# Patient Record
Sex: MALE | Race: WHITE | NOT HISPANIC OR LATINO | URBAN - METROPOLITAN AREA
[De-identification: names, ages, dates, MRNs, and addresses within clinical notes are randomized per-mention and may not be internally consistent; named-entity substitution may affect disease eponyms.]

---

## 2018-05-17 PROBLEM — 63532004 DIVERTICULA OF INTESTINE: Status: ACTIVE | Noted: 2018-05-17

## 2018-05-17 PROBLEM — 39839004 DIAPHRAGMATIC HERNIA: Status: ACTIVE | Noted: 2018-05-17

## 2018-05-17 PROBLEM — 302914006 BARRETT'S ESOPHAGUS: Status: ACTIVE | Noted: 2018-05-17

## 2018-05-17 PROBLEM — 414916001 OBESITY: Status: ACTIVE | Noted: 2018-05-17

## 2018-05-17 PROBLEM — 428283002 HISTORY OF POLYP OF COLON (SITUATION): Status: ACTIVE | Noted: 2018-05-17

## 2021-04-08 ENCOUNTER — OFFICE VISIT (OUTPATIENT)
Dept: URBAN - METROPOLITAN AREA CLINIC 27 | Facility: CLINIC | Age: 51
End: 2021-04-08

## 2021-05-03 ENCOUNTER — OFFICE VISIT (OUTPATIENT)
Dept: URBAN - METROPOLITAN AREA SURGERY CENTER 7 | Facility: SURGERY CENTER | Age: 51
End: 2021-05-03

## 2022-04-30 ENCOUNTER — TELEPHONE ENCOUNTER (OUTPATIENT)
Dept: URBAN - METROPOLITAN AREA CLINIC 121 | Facility: CLINIC | Age: 52
End: 2022-04-30

## 2022-04-30 RX ORDER — AMLODIPINE AND ATORVASTATIN 5; 20 MG/1; MG/1
QD TABLET, FILM COATED ORAL
OUTPATIENT
Start: 2014-06-16

## 2022-04-30 RX ORDER — AMLODIPINE AND ATORVASTATIN 5; 20 MG/1; MG/1
QD TABLET, FILM COATED ORAL
OUTPATIENT
Start: 2014-06-16 | End: 2018-05-17

## 2022-04-30 RX ORDER — LISINOPRIL 10 MG/1
QD TABLET ORAL
OUTPATIENT
Start: 2014-06-16

## 2022-04-30 RX ORDER — AMLODIPINE/ATORVASTATIN 5 MG-10 MG
TABLET ORAL
OUTPATIENT
Start: 2018-05-17

## 2022-04-30 RX ORDER — CLONAZEPAM 1 MG/1
2/ QD TABLET ORAL
OUTPATIENT
Start: 2014-06-16

## 2022-04-30 RX ORDER — LISINOPRIL 10 MG/1
QD TABLET ORAL
OUTPATIENT
Start: 2014-06-16 | End: 2018-05-17

## 2022-04-30 RX ORDER — CLONAZEPAM 1 MG/1
2/ QD TABLET ORAL
OUTPATIENT
Start: 2014-06-16 | End: 2018-05-17

## 2022-04-30 RX ORDER — AMLODIPINE/ATORVASTATIN 5 MG-10 MG
TABLET ORAL
OUTPATIENT
Start: 2018-05-17 | End: 2021-05-03

## 2022-05-01 ENCOUNTER — TELEPHONE ENCOUNTER (OUTPATIENT)
Dept: URBAN - METROPOLITAN AREA CLINIC 121 | Facility: CLINIC | Age: 52
End: 2022-05-01

## 2022-05-01 RX ORDER — QUINAPRIL HYDROCHLORIDE 40 MG/1
TABLET, FILM COATED ORAL
Status: ACTIVE | COMMUNITY
Start: 2018-05-17

## 2022-05-01 RX ORDER — ICOSAPENT ETHYL 1000 MG/1
CAPSULE ORAL
Status: ACTIVE | COMMUNITY
Start: 2021-05-03

## 2022-05-01 RX ORDER — ATENOLOL 50 MG/1
2 QD TABLET ORAL
Status: ACTIVE | COMMUNITY
Start: 2014-06-16

## 2022-05-01 RX ORDER — TIMOLOL MALEATE 5 MG/ML
SOLUTION OPHTHALMIC
Status: ACTIVE | COMMUNITY
Start: 2018-05-17

## 2022-06-07 ENCOUNTER — CLAIMS CREATED FROM THE CLAIM WINDOW (OUTPATIENT)
Dept: URBAN - METROPOLITAN AREA SURGERY CENTER 7 | Facility: SURGERY CENTER | Age: 52
End: 2022-06-07
Payer: COMMERCIAL

## 2022-06-07 ENCOUNTER — WEB ENCOUNTER (OUTPATIENT)
Dept: URBAN - METROPOLITAN AREA SURGERY CENTER 7 | Facility: SURGERY CENTER | Age: 52
End: 2022-06-07

## 2022-06-07 ENCOUNTER — CLAIMS CREATED FROM THE CLAIM WINDOW (OUTPATIENT)
Dept: URBAN - METROPOLITAN AREA CLINIC 4 | Facility: CLINIC | Age: 52
End: 2022-06-07
Payer: COMMERCIAL

## 2022-06-07 DIAGNOSIS — K22.70 BARRETT ESOPHAGUS: ICD-10-CM

## 2022-06-07 DIAGNOSIS — K22.70 BARRETT'S ESOPHAGUS WITHOUT DYSPLASIA: ICD-10-CM

## 2022-06-07 DIAGNOSIS — K31.89 OTHER DISEASES OF STOMACH AND DUODENUM: ICD-10-CM

## 2022-06-07 PROCEDURE — G8907 PT DOC NO EVENTS ON DISCHARG: HCPCS | Performed by: INTERNAL MEDICINE

## 2022-06-07 PROCEDURE — 88312 SPECIAL STAINS GROUP 1: CPT | Performed by: PATHOLOGY

## 2022-06-07 PROCEDURE — 43239 EGD BIOPSY SINGLE/MULTIPLE: CPT | Performed by: INTERNAL MEDICINE

## 2022-06-07 PROCEDURE — 88305 TISSUE EXAM BY PATHOLOGIST: CPT | Performed by: PATHOLOGY

## 2022-06-07 RX ORDER — TIMOLOL MALEATE 5 MG/ML
SOLUTION OPHTHALMIC
Status: ACTIVE | COMMUNITY
Start: 2018-05-17

## 2022-06-07 RX ORDER — ICOSAPENT ETHYL 1000 MG/1
CAPSULE ORAL
Status: ACTIVE | COMMUNITY
Start: 2021-05-03

## 2022-06-07 RX ORDER — QUINAPRIL HYDROCHLORIDE 40 MG/1
TABLET, FILM COATED ORAL
Status: ACTIVE | COMMUNITY
Start: 2018-05-17

## 2022-06-07 RX ORDER — ATENOLOL 50 MG/1
2 QD TABLET ORAL
Status: ACTIVE | COMMUNITY
Start: 2014-06-16

## 2024-11-20 ENCOUNTER — DASHBOARD ENCOUNTERS (OUTPATIENT)
Age: 54
End: 2024-11-20

## 2024-11-20 ENCOUNTER — OFFICE VISIT (OUTPATIENT)
Dept: URBAN - METROPOLITAN AREA CLINIC 27 | Facility: CLINIC | Age: 54
End: 2024-11-20
Payer: COMMERCIAL

## 2024-11-20 VITALS
HEIGHT: 73 IN | BODY MASS INDEX: 28.36 KG/M2 | SYSTOLIC BLOOD PRESSURE: 142 MMHG | HEART RATE: 74 BPM | WEIGHT: 214 LBS | DIASTOLIC BLOOD PRESSURE: 82 MMHG

## 2024-11-20 DIAGNOSIS — K21.9 GASTRO-ESOPHAGEAL REFLUX DISEASE WITHOUT ESOPHAGITIS: ICD-10-CM

## 2024-11-20 DIAGNOSIS — K22.70 BARRETT'S ESOPHAGUS WITHOUT DYSPLASIA: ICD-10-CM

## 2024-11-20 DIAGNOSIS — Z86.0101 PERSONAL HISTORY OF ADENOMATOUS AND SERRATED COLON POLYPS: ICD-10-CM

## 2024-11-20 PROBLEM — 428283002: Status: ACTIVE | Noted: 2024-11-20

## 2024-11-20 PROCEDURE — 99214 OFFICE O/P EST MOD 30 MIN: CPT | Performed by: PHYSICIAN ASSISTANT

## 2024-11-20 RX ORDER — QUINAPRIL HYDROCHLORIDE 40 MG/1
TABLET, FILM COATED ORAL
Status: ACTIVE | COMMUNITY
Start: 2018-05-17

## 2024-11-20 RX ORDER — ICOSAPENT ETHYL 1000 MG/1
CAPSULE ORAL
Status: ACTIVE | COMMUNITY
Start: 2021-05-03

## 2024-11-20 RX ORDER — TIMOLOL MALEATE 5 MG/ML
SOLUTION/ DROPS OPHTHALMIC
Status: ACTIVE | COMMUNITY
Start: 2018-05-17

## 2024-11-20 RX ORDER — ATENOLOL 50 MG/1
2 QD TABLET ORAL
Status: ACTIVE | COMMUNITY
Start: 2014-06-16

## 2024-11-20 NOTE — HPI-ZZZTODAY'S VISIT
Mr. Tuttle is a 54-year-old male patient of Dr. Piper here for colonoscopy and EGD consultation. He has Ledezma's esophagus. He has successfully lost a lot of weight since he was last seen and also completely stopped drinking alcohol. He is off Pepcid now, denies any heartburn, dysphagia. He denies change in bowel habits, melena, hematochezia. . EGD 2022:Ledezma's esophagus, small HH, mild gastritis, mild duodenitis; recall 3 years Colonoscopy 2021:5 small polyps, small internal hemorrhoids; recall 3 years with extended prep; history of adenomatous polyps

## 2024-12-19 ENCOUNTER — OFFICE VISIT (OUTPATIENT)
Dept: URBAN - METROPOLITAN AREA SURGERY CENTER 7 | Facility: SURGERY CENTER | Age: 54
End: 2024-12-19

## 2024-12-19 ENCOUNTER — CLAIMS CREATED FROM THE CLAIM WINDOW (OUTPATIENT)
Dept: URBAN - METROPOLITAN AREA CLINIC 4 | Facility: CLINIC | Age: 54
End: 2024-12-19
Payer: COMMERCIAL

## 2024-12-19 ENCOUNTER — CLAIMS CREATED FROM THE CLAIM WINDOW (OUTPATIENT)
Dept: URBAN - METROPOLITAN AREA SURGERY CENTER 7 | Facility: SURGERY CENTER | Age: 54
End: 2024-12-19
Payer: COMMERCIAL

## 2024-12-19 DIAGNOSIS — K31.89 OTHER DISEASES OF STOMACH AND DUODENUM: ICD-10-CM

## 2024-12-19 DIAGNOSIS — Z12.11 COLON CANCER SCREENING (HIGH RISK): ICD-10-CM

## 2024-12-19 DIAGNOSIS — D12.5 ADENOMATOUS POLYP OF SIGMOID COLON: ICD-10-CM

## 2024-12-19 DIAGNOSIS — K22.70 BARRETT ESOPHAGUS WITHOUT DYSPLASIA: ICD-10-CM

## 2024-12-19 DIAGNOSIS — K63.89 OTHER SPECIFIED DISEASES OF INTESTINE: ICD-10-CM

## 2024-12-19 DIAGNOSIS — K29.70 GASTRITIS: ICD-10-CM

## 2024-12-19 DIAGNOSIS — K44.9 HIATAL HERNIA: ICD-10-CM

## 2024-12-19 DIAGNOSIS — Z86.0100 PERSONAL HISTORY OF COLONIC POLYPS: ICD-10-CM

## 2024-12-19 DIAGNOSIS — K21.9 GASTRO-ESOPHAGEAL REFLUX DISEASE WITHOUT ESOPHAGITIS: ICD-10-CM

## 2024-12-19 PROCEDURE — 00813 ANES UPR LWR GI NDSC PX: CPT | Performed by: ANESTHESIOLOGY

## 2024-12-19 PROCEDURE — 88312 SPECIAL STAINS GROUP 1: CPT | Performed by: PATHOLOGY

## 2024-12-19 PROCEDURE — 88305 TISSUE EXAM BY PATHOLOGIST: CPT | Performed by: PATHOLOGY

## 2024-12-19 RX ORDER — TIMOLOL MALEATE 5 MG/ML
SOLUTION/ DROPS OPHTHALMIC
Status: ACTIVE | COMMUNITY
Start: 2018-05-17

## 2024-12-19 RX ORDER — ATENOLOL 50 MG/1
2 QD TABLET ORAL
Status: ACTIVE | COMMUNITY
Start: 2014-06-16

## 2024-12-19 RX ORDER — ICOSAPENT ETHYL 1000 MG/1
CAPSULE ORAL
Status: ACTIVE | COMMUNITY
Start: 2021-05-03

## 2024-12-19 RX ORDER — QUINAPRIL HYDROCHLORIDE 40 MG/1
TABLET, FILM COATED ORAL
Status: ACTIVE | COMMUNITY
Start: 2018-05-17